# Patient Record
Sex: MALE | Race: WHITE | URBAN - METROPOLITAN AREA
[De-identification: names, ages, dates, MRNs, and addresses within clinical notes are randomized per-mention and may not be internally consistent; named-entity substitution may affect disease eponyms.]

---

## 2017-02-06 ENCOUNTER — OFFICE VISIT (OUTPATIENT)
Dept: FAMILY MEDICINE | Facility: CLINIC | Age: 52
End: 2017-02-06
Payer: MEDICAID

## 2017-02-06 ENCOUNTER — RADIANT APPOINTMENT (OUTPATIENT)
Dept: GENERAL RADIOLOGY | Facility: CLINIC | Age: 52
End: 2017-02-06
Attending: FAMILY MEDICINE
Payer: MEDICAID

## 2017-02-06 ENCOUNTER — TRANSFERRED RECORDS (OUTPATIENT)
Dept: HEALTH INFORMATION MANAGEMENT | Facility: CLINIC | Age: 52
End: 2017-02-06

## 2017-02-06 VITALS
DIASTOLIC BLOOD PRESSURE: 104 MMHG | SYSTOLIC BLOOD PRESSURE: 152 MMHG | WEIGHT: 251 LBS | OXYGEN SATURATION: 95 % | HEIGHT: 73 IN | RESPIRATION RATE: 16 BRPM | HEART RATE: 101 BPM | TEMPERATURE: 97.1 F | BODY MASS INDEX: 33.27 KG/M2

## 2017-02-06 DIAGNOSIS — K40.90 UNILATERAL INGUINAL HERNIA WITHOUT OBSTRUCTION OR GANGRENE, RECURRENCE NOT SPECIFIED: ICD-10-CM

## 2017-02-06 DIAGNOSIS — I10 HYPERTENSION GOAL BP (BLOOD PRESSURE) < 140/90: ICD-10-CM

## 2017-02-06 DIAGNOSIS — I50.9 ACUTE CONGESTIVE HEART FAILURE, UNSPECIFIED CONGESTIVE HEART FAILURE TYPE: ICD-10-CM

## 2017-02-06 DIAGNOSIS — R53.83 FATIGUE, UNSPECIFIED TYPE: ICD-10-CM

## 2017-02-06 DIAGNOSIS — R06.02 SOB (SHORTNESS OF BREATH) ON EXERTION: ICD-10-CM

## 2017-02-06 DIAGNOSIS — R94.31 ABNORMAL ELECTROCARDIOGRAM: Primary | ICD-10-CM

## 2017-02-06 DIAGNOSIS — R05.9 COUGH: ICD-10-CM

## 2017-02-06 LAB
ANION GAP SERPL CALCULATED.3IONS-SCNC: ABNORMAL MMOL/L
BASOPHILS # BLD AUTO: 0.1 10E9/L (ref 0–0.2)
BASOPHILS NFR BLD AUTO: 0.4 %
BNP SERPL-MCNC: 2625 PG/ML
BUN SERPL-MCNC: 25 MG/DL
CALCIUM SERPL-MCNC: 9.4 MG/DL
CHLORIDE SERPLBLD-SCNC: 98 MMOL/L
CO2 SERPL-SCNC: 20 MMOL/L
CREAT SERPL-MCNC: 1.27 MG/DL
DIFFERENTIAL METHOD BLD: ABNORMAL
EOSINOPHIL # BLD AUTO: 0.1 10E9/L (ref 0–0.7)
EOSINOPHIL NFR BLD AUTO: 0.5 %
ERYTHROCYTE [DISTWIDTH] IN BLOOD BY AUTOMATED COUNT: 14.9 % (ref 10–15)
ERYTHROCYTE [DISTWIDTH] IN BLOOD BY AUTOMATED COUNT: NORMAL %
GFR SERPL CREATININE-BSD FRML MDRD: ABNORMAL ML/MIN/1.73M2
GLUCOSE SERPL-MCNC: 111 MG/DL (ref 70–99)
HBA1C MFR BLD: 6 % (ref 4.3–6)
HCT VFR BLD AUTO: 43.7 % (ref 40–53)
HCT VFR BLD AUTO: NORMAL %
HEMOGLOBIN: NORMAL G/DL (ref 13.3–17.7)
HGB BLD-MCNC: 13.9 G/DL (ref 13.3–17.7)
LYMPHOCYTES # BLD AUTO: 1 10E9/L (ref 0.8–5.3)
LYMPHOCYTES NFR BLD AUTO: 8.5 %
MCH RBC QN AUTO: 24.9 PG (ref 26.5–33)
MCH RBC QN AUTO: NORMAL PG
MCHC RBC AUTO-ENTMCNC: 31.8 G/DL (ref 31.5–36.5)
MCHC RBC AUTO-ENTMCNC: NORMAL G/DL
MCV RBC AUTO: 77 FL
MCV RBC AUTO: 78 FL (ref 78–100)
MONOCYTES # BLD AUTO: 1.2 10E9/L (ref 0–1.3)
MONOCYTES NFR BLD AUTO: 10.1 %
NEUTROPHILS # BLD AUTO: 9.9 10E9/L (ref 1.6–8.3)
NEUTROPHILS NFR BLD AUTO: 80.5 %
PLATELET # BLD AUTO: 277 10^9/L
PLATELET # BLD AUTO: 301 10E9/L (ref 150–450)
POTASSIUM SERPL-SCNC: 3.9 MMOL/L
RBC # BLD AUTO: 5.58 10E12/L (ref 4.4–5.9)
RBC # BLD AUTO: NORMAL 10^12/L
SODIUM SERPL-SCNC: 134 MMOL/L
WBC # BLD AUTO: 12.3 10E9/L (ref 4–11)
WBC # BLD AUTO: NORMAL 10^9/L

## 2017-02-06 PROCEDURE — 99214 OFFICE O/P EST MOD 30 MIN: CPT | Performed by: FAMILY MEDICINE

## 2017-02-06 PROCEDURE — 71020 XR CHEST 2 VW: CPT

## 2017-02-06 PROCEDURE — 93000 ELECTROCARDIOGRAM COMPLETE: CPT | Performed by: FAMILY MEDICINE

## 2017-02-06 PROCEDURE — 36415 COLL VENOUS BLD VENIPUNCTURE: CPT | Performed by: FAMILY MEDICINE

## 2017-02-06 PROCEDURE — 83036 HEMOGLOBIN GLYCOSYLATED A1C: CPT | Performed by: FAMILY MEDICINE

## 2017-02-06 PROCEDURE — 85025 COMPLETE CBC W/AUTO DIFF WBC: CPT | Performed by: FAMILY MEDICINE

## 2017-02-06 NOTE — PATIENT INSTRUCTIONS
"  HPI:     Otoniel is a 52 yo patient with a HTN, Hypothyroidism presenting with SOB, cough and fatigue for the past 3 days preceded by flu like symptoms.. He had not been taking medication    O/E:   /104 mmHg  Pulse 101  Temp(Src) 97.1  F (36.2  C)  Resp 16  Ht 6' 1\" (1.854 m)  Wt 251 lb (113.853 kg)  BMI 33.12 kg/m2  SpO2 95%  General: Appears to be in moderate distress  RS: SOB, rales with wheezes  CVS: Tachycardic, no murmur    EKG: Shows ST elevation in anterior leads    A/P:  1. CAD: Need ER evaluation.   2. CHF  3. Hernia    Moses Orona      [facility]    If you have any questions regarding to your visit please contact your care team:       Team Purple:   Clinic Hours Telephone Number   ED [address] Dr[address]   7am-7pm  Monday - Thursday   7am-5pm  Fridays  [phone]  (Appointment scheduling available 24/7)    Questions about your Visit?   Team Line:  [phone]   Urgent Care - [facility] [facility] - 11am-9pm Monday-Friday Saturday-Sunday- 9am-5pm   [place] - 5pm-9pm Monday-Friday Saturday-Sunday- 9am-5pm  [phone] - [place]   [phone] - [place]       What options do I have for visits at the clinic other than the traditional office visit?  To expand how we care for you, many of our providers are utilizing electronic visits (e-visits) and telephone visits, when medically appropriate, for interactions with their patients rather than a visit in the clinic.   We also offer nurse visits for many medical concerns. Just like any other service, we will bill your insurance company for this type of visit based on time spent on the phone with your provider. Not all insurance companies cover these visits. Please check with your medical insurance if this type of visit is covered. You will be responsible for any charges that are not paid by your insurance.      E-visits via Hyphen 8:  generally incur " a $35.00 fee.  Telephone visits:  Time spent on the phone: *charged based on time that is spent on the phone in increments of 10 minutes. Estimated cost:   5-10 mins $30.00   11-20 mins. $59.00   21-30 mins. $85.00     Use [email or web address] (secure email communication and access to your chart) to send your primary care provider a message or make an appointment. Ask someone on your Team how to sign up for Fitbit.  For a Price Quote for your services, please call our Consumer Price Line at [phone].  As always, Thank you for trusting us with your health care needs!    Discharged By: An

## 2017-02-06 NOTE — MR AVS SNAPSHOT
"              After Visit Summary   2/6/2017    Otoniel Carlton    MRN: [id number]           Patient Information     Date Of Birth          1965        Visit Information        Provider Department      2/6/2017 2:20 PM Moses Orona MD [facility]        Today's Diagnoses     Cough    -  1     SOB (shortness of breath) on exertion         Hypertension goal BP (blood pressure) < 140/90         LBBB (left bundle branch block)         Hypothyroidism, unspecified type         Unilateral inguinal hernia without obstruction or gangrene, recurrence not specified         Fatigue, unspecified type           Care Instructions      HPI:     Otoniel is a 52 yo patient with a HTN, Hypothyroidism presenting with SOB, cough and fatigue for the past 3 days preceded by flu like symptoms.. He had not been taking medication    O/E:   /104 mmHg  Pulse 101  Temp(Src) 97.1  F (36.2  C)  Resp 16  Ht 6' 1\" (1.854 m)  Wt 251 lb (113.853 kg)  BMI 33.12 kg/m2  SpO2 95%  General: Appears to be in moderate distress  RS: SOB, rales with wheezes  CVS: Tachycardic, no murmur    EKG: Shows ST elevation in anterior leads    A/P:  1. CAD: Need ER evaluation.   2. CHF  3. Hernia    Moses Orona      [facility]    If you have any questions regarding to your visit please contact your care team:       Team Purple:   Clinic Hours Telephone Number   ED [address] Dr[address]   7am-7pm  Monday - Thursday   7am-5pm  Fridays  [phone]  (Appointment scheduling available 24/7)    Questions about your Visit?   Team Line:  [phone]   Urgent Care - [place] - 11am-9pm Monday-Friday Saturday-Sunday- 9am-5pm   [place] - 5pm-9pm Monday-Friday Saturday-Sunday- 9am-5pm  [phone] - [place]  [phone] - [place]       What options do I have for visits at the clinic other than the traditional office visit?  To expand " how we care for you, many of our providers are utilizing electronic visits (e-visits) and telephone visits, when medically appropriate, for interactions with their patients rather than a visit in the clinic.   We also offer nurse visits for many medical concerns. Just like any other service, we will bill your insurance company for this type of visit based on time spent on the phone with your provider. Not all insurance companies cover these visits. Please check with your medical insurance if this type of visit is covered. You will be responsible for any charges that are not paid by your insurance.      E-visits via BuzzSpicet:  generally incur a $35.00 fee.  Telephone visits:  Time spent on the phone: *charged based on time that is spent on the phone in increments of 10 minutes. Estimated cost:   5-10 mins $30.00   11-20 mins. $59.00   21-30 mins. $85.00     Use [employer] Finanzas (secure email communication and access to your chart) to send your primary care provider a message or make an appointment. Ask someone on your Team how to sign up for [employer].  For a Price Quote for your services, please call our Xumii Line at [phone].  As always, Thank you for trusting us with your health care needs!    Discharged By: An          Follow-ups after your visit        Who to contact     If you have questions or need follow up information about today's clinic visit or your schedule please contact [facility] directly at [phone].  Normal or non-critical lab and imaging results will be communicated to you by Inspirishart, letter or phone within 4 business days after the clinic has received the results. If you do not hear from us within 7 days, please contact the clinic through Inspirishart or phone. If you have a critical or abnormal lab result, we will notify you by phone as soon as possible.  Submit refill requests through [employer] Finanzas or call your pharmacy and they will forward the refill request to us. Please allow 3  "business days for your refill to be completed.          Additional Information About Your Visit        MyChart Information     [employer] gives you secure access to your electronic health record. If you see a primary care provider, you can also send messages to your care team and make appointments. If you have questions, please call your primary care clinic.  If you do not have a primary care provider, please call [phone] and they will assist you.        Care EveryWhere ID     This is your Care EveryWhere ID. This could be used by other organizations to access your [facility] medical records  [id number]        Your Vitals Were     Pulse Temperature Respirations Height BMI (Body Mass Index) Pulse Oximetry    101 97.1  F (36.2  C) 16 6' 1\" (1.854 m) 33.12 kg/m2 95%       Blood Pressure from Last 3 Encounters:   02/06/17 152/104   06/18/15 146/91   06/15/15 146/91    Weight from Last 3 Encounters:   02/06/17 251 lb (113.853 kg)   06/18/15 252 lb 6.4 oz (114.488 kg)   06/15/15 260 lb 1.6 oz (117.981 kg)              We Performed the Following     CBC with platelets differential     EKG 12-lead complete w/read - Clinics     Hemoglobin A1c     XR Chest 2 Views        Primary Care Provider Office Phone # Fax #    Jose Arias [phone]       [facility] [address] [facility] [address]        Thank you!     Thank you for choosing [facility]  for your care. Our goal is always to provide you with excellent care. Hearing back from our patients is one way we can continue to improve our services. Please take a few minutes to complete the written survey that you may receive in the mail after your visit with us. Thank you!             Your Updated Medication List - Protect others around you: Learn how to safely use, store and throw away your medicines at www.disposemymeds.org.          This list is accurate as of: 2/6/17  4:05 PM.  Always use your most recent med list.          "          Brand Name Dispense Instructions for use    amLODIPine 10 MG tablet    NORVASC    30 tablet    TAKE 1 TABLET BY MOUTH DAILY       carvedilol 6.25 MG tablet    COREG    60 tablet    Take 1 tablet (6.25 mg) by mouth 2 times daily (with meals)       citalopram 20 MG tablet    celeXA    90 tablet    Take 1 tablet (20 mg) by mouth daily       cloNIDine 0.2 MG tablet    CATAPRES    60 tablet    TAKE 1 TABLET BY MOUTH TWICE DAILY       levothyroxine 25 MCG tablet    SYNTHROID/LEVOTHROID    90 tablet    Take 1 tablet (25 mcg) by mouth daily       losartan-hydrochlorothiazide 100-12.5 MG per tablet    HYZAAR    90 tablet    TAKE 1 TABLET BY MOUTH DAILY       minoxidil 2.5 MG tablet    LONITEN    180 tablet    TAKE 2 TABLETS BY MOUTH DAILY       pantoprazole 20 MG EC tablet    PROTONIX    90 tablet    Take by mouth 30-60 minutes before a meal.       zolpidem 10 MG tablet    AMBIEN    5 tablet    Take 1 tablet (10 mg) by mouth nightly as needed for sleep

## 2017-02-06 NOTE — PROGRESS NOTES
SUBJECTIVE:                                                    Otoniel Carlton is a 51 year old male who presents to clinic today for the following health issues:    Acute Illness   Acute illness concerns: cough  Onset: since january     Fever: no     Chills/Sweats: YES    Headache (location?): no     Sinus Pressure:no    Conjunctivitis:  no    Ear Pain: no    Rhinorrhea: YES    Congestion: YES    Sore Throat: no      Cough: YES-with shortness of breath, worsening over time    Wheeze: YES    Decreased Appetite: no     Nausea: YES    Vomiting: no     Diarrhea:  YES    Dysuria/Freq.: no    Fatigue/Achiness: YES    Sick/Strep Exposure: YES     Therapies Tried and outcome:     Patient presenting with cough, SOB, hot and cold flushes, diarrhea x 2 days.  He now has a hernia in the Rt inguinal area and disappears with laying down.  Denies any chest pain, no dizziness, nausea or vomiting.  Has a bulge in the Rt Groin  He was on BP medications but due to insurance lapses not been taking his medications.    Hypertension:  blood pressure  BP Readings from Last 6 Encounters:   02/06/17 152/104   06/18/15 146/91   06/15/15 146/91   05/18/15 134/79   05/12/15 127/77   05/11/15 117/68     Problem list and histories reviewed & adjusted, as indicated.  Additional history: as documented    Patient Active Problem List   Diagnosis     Hypertension goal BP (blood pressure) < 140/90     LBBB (left bundle branch block)     SOB (shortness of breath) on exertion     Depression with anxiety     Hypothyroidism     Past Surgical History   Procedure Laterality Date     Bursitis       left arm     Tonsillectomy         Social History   Substance Use Topics     Smoking status: Former Smoker -- 0.50 packs/day for 17 years     Types: Cigarettes     Start date: 06/13/1993     Quit date: 06/13/2010     Smokeless tobacco: Never Used     Alcohol Use: Yes      Comment: social     Family History   Problem Relation Age of Onset     Hypertension Mother       "Hypertension Father      CANCER Mother      breast cancer     CANCER Maternal Aunt      breast cancer     DIABETES No family hx of      CEREBROVASCULAR DISEASE No family hx of      Thyroid Disease No family hx of      Glaucoma No family hx of      Macular Degeneration No family hx of      KIDNEY DISEASE No family hx of      Coronary Artery Disease Father      1st MI in his 50s,  at age 71 - MI     Hypertension Mother      Hypertension Maternal Aunt            ROS:  Constitutional, HEENT, cardiovascular, pulmonary, gi and gu systems are negative, except as otherwise noted.    OBJECTIVE:                                                    /104 mmHg  Pulse 101  Temp(Src) 97.1  F (36.2  C)  Resp 16  Ht 6' 1\" (1.854 m)  Wt 251 lb (113.853 kg)  BMI 33.12 kg/m2  SpO2 95%  Body mass index is 33.12 kg/(m^2).  GENERAL: Sick looking, alert and mild distress  NECK: no adenopathy and thyroid normal to palpation  RESP: Some wheezing and basal crackles  CV: regular rate and rhythm, no murmur, click or rub, mild peripheral edema  ABDOMEN: soft, nontender, no masses and bowel sounds normal  MS: no gross musculoskeletal defects noted, no edema    Diagnostic Test Results:     Results for orders placed or performed in visit on 17   XR Chest 2 Views    Narrative    CHEST TWO VIEWS   2017 3:55 PM     HISTORY: Shortness of breath.    COMPARISON: 2006.      Impression    IMPRESSION: Heart is enlarged. There is congestion of the central  pulmonary vascularity and bilateral interstitial infiltrate which  could represent edema. These findings are likely due to congestive  failure. No definite pleural effusions are seen. The remainder of the  chest is negative.     DENA WHITE MD   CBC with platelets differential   Result Value Ref Range    WBC 12.3 (H) 4.0 - 11.0 10e9/L    RBC Count 5.58 4.4 - 5.9 10e12/L    Hemoglobin 13.9 13.3 - 17.7 g/dL    Hematocrit 43.7 40.0 - 53.0 %    MCV 78 78 - 100 fl    MCH 24.9 (L) " 26.5 - 33.0 pg    MCHC 31.8 31.5 - 36.5 g/dL    RDW 14.9 10.0 - 15.0 %    Platelet Count 301 150 - 450 10e9/L    Diff Method Automated Method     % Neutrophils 80.5 %    % Lymphocytes 8.5 %    % Monocytes 10.1 %    % Eosinophils 0.5 %    % Basophils 0.4 %    Absolute Neutrophil 9.9 (H) 1.6 - 8.3 10e9/L    Absolute Lymphocytes 1.0 0.8 - 5.3 10e9/L    Absolute Monocytes 1.2 0.0 - 1.3 10e9/L    Absolute Eosinophils 0.1 0.0 - 0.7 10e9/L    Absolute Basophils 0.1 0.0 - 0.2 10e9/L   Hemoglobin A1c   Result Value Ref Range    Hemoglobin A1C 6.0 4.3 - 6.0 %          ASSESSMENT/PLAN:                                                    (R94.31) Abnormal electrocardiogram  (primary encounter diagnosis)  Comment: Concern for CAD with the SOB and pulmonary congestion on CXR  Plan: Evaluation in ER, patient declining ambulance but explained urgency of the situation agreed to go by ambulance.    (I50.9) Acute congestive heart failure, unspecified congestive heart failure type (H)  Comment: CXR with cardiomegaly and pulmonary congestion; symptoms consistent with acute CHF  Plan: ER evaluation    (R05) Cough  Comment: From pulmonary congestion likely from acute CHF from   Plan: XR Chest 2 Views, CBC with platelets         differential    (R06.02) SOB (shortness of breath) on exertion  Comment: EKG and CXR abnormal  Plan: EKG 12-lead complete w/read - Clinics, XR Chest        2 Views    (I10) Hypertension goal BP (blood pressure) < 140/90  Comment: Uncontrolled. Non compliance with medication  Plan: Will restart medication.    (R53.83) Fatigue, unspecified type  Comment: A1c normal, no diabetes, fatigue from heart failure.  Plan: Hemoglobin A1c    (K40.90) Unilateral inguinal hernia without obstruction or gangrene, recurrence not specified  Comment: Reducible. Non obstructive  Plan: Assess after acute treatment    Will go to ER by ambulance.    Moses Orona MD  [facility]

## 2017-02-07 ENCOUNTER — TRANSFERRED RECORDS (OUTPATIENT)
Dept: CARDIOLOGY | Facility: CLINIC | Age: 52
End: 2017-02-07

## 2017-02-07 LAB
EJECTION FRACTION: 18
TSH SERPL-ACNC: 3.87 MCU/ML

## 2017-02-09 ENCOUNTER — TRANSFERRED RECORDS (OUTPATIENT)
Dept: CARDIOLOGY | Facility: CLINIC | Age: 52
End: 2017-02-09

## 2017-02-11 LAB
ANION GAP SERPL CALCULATED.3IONS-SCNC: NORMAL MMOL/L
BUN SERPL-MCNC: NORMAL MG/DL
CALCIUM SERPL-MCNC: NORMAL MG/DL
CHLORIDE SERPLBLD-SCNC: NORMAL MMOL/L
CO2 SERPL-SCNC: NORMAL MMOL/L
CREAT SERPL-MCNC: 1.15 MG/DL
GFR SERPL CREATININE-BSD FRML MDRD: NORMAL ML/MIN/1.73M2
GLUCOSE SERPL-MCNC: NORMAL MG/DL (ref 70–99)
HEMOGLOBIN: 13.9 G/DL (ref 13.3–17.7)
POTASSIUM SERPL-SCNC: 4.5 MMOL/L
SODIUM SERPL-SCNC: 139 MMOL/L
WBC # BLD AUTO: 8.3 10^9/L

## 2017-03-14 ENCOUNTER — TRANSFERRED RECORDS (OUTPATIENT)
Dept: HEALTH INFORMATION MANAGEMENT | Facility: CLINIC | Age: 52
End: 2017-03-14

## 2017-03-16 ENCOUNTER — DOCUMENTATION ONLY (OUTPATIENT)
Dept: CARDIOLOGY | Facility: CLINIC | Age: 52
End: 2017-03-16

## 2017-03-16 NOTE — PROGRESS NOTES
Records request sent to [facility].  Hospital summary stating an echo and angiogram were done on recent hospitalization, no records available.

## 2017-03-29 DIAGNOSIS — E03.9 HYPOTHYROIDISM, UNSPECIFIED TYPE: ICD-10-CM

## 2017-03-29 DIAGNOSIS — I10 ESSENTIAL HYPERTENSION: ICD-10-CM

## 2017-03-29 DIAGNOSIS — I10 HYPERTENSION GOAL BP (BLOOD PRESSURE) < 140/90: ICD-10-CM

## 2017-03-30 NOTE — TELEPHONE ENCOUNTER
losartan-hydrochlorothiazide (HYZAAR) 100-12.5 MG per tablet   Last Written Prescription Date: 3/1/16  Last Fill Quantity: 90, # refills: 0  Last Office Visit with [facility], [facility] or [facility] prescribing provider: 2/6/17       Potassium   Date Value Ref Range Status   02/11/2017 4.5 mmol/L Final     Creatinine   Date Value Ref Range Status   02/11/2017 1.15 mg/dL Final     BP Readings from Last 3 Encounters:   02/06/17 (!) 152/104   06/18/15 (!) 146/91   06/15/15 (!) 146/91       amLODIPine (NORVASC) 10 MG tablet      Last Written Prescription Date: 2/23/16  Last Fill Quantity: 30, # refills: 0    Last Office Visit with [facility] or [facility] prescribing provider:  2/6/17   Future Office Visit:        BP Readings from Last 3 Encounters:   02/06/17 (!) 152/104   06/18/15 (!) 146/91   06/15/15 (!) 146/91       levothyroxine (SYNTHROID, LEVOTHROID) 25 MCG tablet     Last Written Prescription Date: 5/1215  Last Quantity: 90, # refills: 3  Last Office Visit with [facility], [facility] or [facility] prescribing provider: 2/6/17        TSH   Date Value Ref Range Status   02/07/2017 3.87 mcU/mL Final       minoxidil (LONITEN) 2.5 MG tablet      Last Written Prescription Date:  12/22/15  Last Fill Quantity: 180,   # refills: 0  Last Office Visit with [facility] or [facility] prescribing provider: 2/6/17  Future Office visit:       Routing refill request to provider for review/approval because:  Drug not on the [facility], [facility] or [facility] refill protocol or controlled substance          Amos Faarax  [facility]

## 2017-04-03 RX ORDER — AMLODIPINE BESYLATE 10 MG/1
TABLET ORAL
Qty: 30 TABLET | Refills: 0 | Status: SHIPPED | OUTPATIENT
Start: 2017-04-03 | End: 2017-07-12

## 2017-04-03 RX ORDER — MINOXIDIL 2.5 MG/1
TABLET ORAL
Qty: 180 TABLET | Refills: 0 | Status: SHIPPED | OUTPATIENT
Start: 2017-04-03 | End: 2017-07-12

## 2017-04-03 RX ORDER — LEVOTHYROXINE SODIUM 25 UG/1
TABLET ORAL
Qty: 90 TABLET | Refills: 0 | Status: SHIPPED | OUTPATIENT
Start: 2017-04-03

## 2017-04-03 RX ORDER — LOSARTAN POTASSIUM AND HYDROCHLOROTHIAZIDE 12.5; 1 MG/1; MG/1
TABLET ORAL
Qty: 90 TABLET | Refills: 0 | Status: SHIPPED | OUTPATIENT
Start: 2017-04-03 | End: 2017-07-12

## 2017-04-04 NOTE — TELEPHONE ENCOUNTER
Abdulaziz Ambrose contacted Otoniel on 04/04/17 and left a message for pt rx has been refilled. If patient calls back please schedule appointment as soon as possible for BP check with Dr. Arias.  Abdulaziz Ambrose,  For Teams Com[facility] and [facility]

## 2017-04-21 ENCOUNTER — TRANSFERRED RECORDS (OUTPATIENT)
Dept: HEALTH INFORMATION MANAGEMENT | Facility: CLINIC | Age: 52
End: 2017-04-21

## 2017-07-12 DIAGNOSIS — I10 HYPERTENSION GOAL BP (BLOOD PRESSURE) < 140/90: ICD-10-CM

## 2017-07-12 DIAGNOSIS — I10 ESSENTIAL HYPERTENSION: ICD-10-CM

## 2017-07-12 RX ORDER — LOSARTAN POTASSIUM AND HYDROCHLOROTHIAZIDE 12.5; 1 MG/1; MG/1
1 TABLET ORAL DAILY
Qty: 30 TABLET | Refills: 0 | Status: SHIPPED | OUTPATIENT
Start: 2017-07-12

## 2017-07-12 RX ORDER — AMLODIPINE BESYLATE 10 MG/1
10 TABLET ORAL DAILY
Qty: 30 TABLET | Refills: 0 | Status: SHIPPED | OUTPATIENT
Start: 2017-07-12

## 2017-07-12 RX ORDER — MINOXIDIL 2.5 MG/1
TABLET ORAL
Qty: 60 TABLET | Refills: 0 | Status: SHIPPED | OUTPATIENT
Start: 2017-07-12

## 2017-07-12 NOTE — TELEPHONE ENCOUNTER
Reason for Call:  Medication or medication refill:    Do you use a [facility]?  Name of the pharmacy and phone number for the current request:  [facility] [address] [place], [place] - [address]    Name of the medication requested: Norvasc 10 mg, Losartan hctz 100-12.5. Minoxidil 2.5 mg    Other request: Made appointment to see you in person in 1 week but out of meds can you please call when approved so I can pick them up thank you  Sending high priority message    Can we leave a detailed message on this number? YES    Phone number patient can be reached at: Cell number on file:    Telephone Information:   Mobile [phone]       Best Time: any    Call taken on 7/12/2017 at 2:40 PM by Ivelisse Mtz

## 2017-07-12 NOTE — TELEPHONE ENCOUNTER
Last OV with Dr. ARIAS, 6/18/15 (patient saw a [place] provider on 2/6/17).      Marlene refills given 3/29/17 with patient advised to follow up in clinic.    Pended 1 week refills per patient request below for review by provider.    Routing refill request to provider for review/approval because:  Marlene given x1   Next 5 appointments (look out 90 days)     Jul 19, 2017  7:00 AM CDT   Office Visit with Jose Arias MD   [facility] ([facility]    [address] [place] [id number] [phone]

## 2017-07-24 ENCOUNTER — TELEPHONE (OUTPATIENT)
Dept: FAMILY MEDICINE | Facility: CLINIC | Age: 52
End: 2017-07-24

## 2017-07-24 NOTE — LETTER
[address]  [facility] [address]  Phone: [phone]            July 27, 2017          Otoniel Carlton,  [address]  [facility] [address]        Dear Otoniel Carlton      Monitoring and managing your preventative and chronic health conditions are very important to us. Our records indicate that you have not scheduled for Appointment with your provider to have your blood pressure recheck which was recommended by Dr. Orona.      If you have received your health care elsewhere, please call the clinic so the information can be documented in your chart.    Please call [phone] or message us through your Cat Amania account to schedule an appointment or provide information for your chart.     Feel free to contact us if you have any questions or concerns!    I look forward to seeing you and working with you on your health care needs.     Sincerely,         Moses Orona / ulysses

## 2017-07-24 NOTE — TELEPHONE ENCOUNTER
Panel Management Review      Patient has the following on his problem list:     Hypertension   Last three blood pressure readings:  BP Readings from Last 3 Encounters:   02/06/17 (!) 152/104   06/18/15 (!) 146/91   06/15/15 (!) 146/91     Blood pressure: FAILED    HTN Guidelines:  Age 18-59 BP range:  Less than 140/90  Age 60-85 with Diabetes:  Less than 140/90  Age 60-85 without Diabetes:  less than 150/90        Composite cancer screening  Chart review shows that this patient is due/due soon for the following Colonoscopy  Summary:    Patient is due/failing the following:   BP CHECK and COLONOSCOPY    Action needed:   Patient needs office visit for Recheck BP.    Type of outreach:    Phone, left message for patient to call back.     Questions for provider review:    None                                                                                                                                    An PEGGY Gama       Chart routed to Care Team .

## 2017-11-07 ENCOUNTER — TELEPHONE (OUTPATIENT)
Dept: FAMILY MEDICINE | Facility: CLINIC | Age: 52
End: 2017-11-07

## 2017-11-07 NOTE — LETTER
November 9, 2017          Otoniel Carlton,  [address]  [facility] [address]        Dear Otoniel Carlton      Monitoring and managing your preventative and chronic health conditions are very important to us. Our records indicate that you have not scheduled for Colonoscopy and blood pressure check  which was recommended by Dr. Orona.      If you have received your health care elsewhere, please call the clinic so the information can be documented in your chart.    Please call [phone] or message us through your BeMyEye account to schedule an appointment or provide information for your chart.     Feel free to contact us if you have any questions or concerns!    I look forward to seeing you and working with you on your health care needs.     Sincerely,         Moses Orona / LUZ

## 2017-11-07 NOTE — TELEPHONE ENCOUNTER
Panel Management Review      Patient has the following on his problem list:     Hypertension   Last three blood pressure readings:  BP Readings from Last 3 Encounters:   02/06/17 (!) 152/104   06/18/15 (!) 146/91   06/15/15 (!) 146/91     Blood pressure: FAILED    HTN Guidelines:  Age 18-59 BP range:  Less than 140/90  Age 60-85 with Diabetes:  Less than 140/90  Age 60-85 without Diabetes:  less than 150/90        Composite cancer screening  Chart review shows that this patient is due/due soon for the following Colonoscopy  Summary:    Patient is due/failing the following:   BP CHECK, COLONOSCOPY and LDL    Action needed:   Patient needs office visit for BP Check.    Type of outreach:    Phone, left message for patient to call back.     Questions for provider review:    None                                                                                                                                    Shari Gama MA       Chart routed to Care Team .

## 2017-12-16 ENCOUNTER — TELEPHONE (OUTPATIENT)
Dept: FAMILY MEDICINE | Facility: CLINIC | Age: 52
End: 2017-12-16

## 2017-12-16 NOTE — TELEPHONE ENCOUNTER
12/16/2017    Call Regarding Preventive Health Screening Colonoscopy    Attempt 3    Message on voicemail     Comments: Clinic made prior attempt(s)        Outreach   Ava Delcid

## 2018-02-22 ENCOUNTER — TELEPHONE (OUTPATIENT)
Dept: FAMILY MEDICINE | Facility: CLINIC | Age: 53
End: 2018-02-22

## 2018-02-22 NOTE — TELEPHONE ENCOUNTER
Panel Management Review      Patient has the following on his problem list:     Hypertension   Last three blood pressure readings:  BP Readings from Last 3 Encounters:   02/06/17 (!) 152/104   06/18/15 (!) 146/91   06/15/15 (!) 146/91     Blood pressure: FAILED    HTN Guidelines:  Age 18-59 BP range:  Less than 140/90  Age 60-85 with Diabetes:  Less than 140/90  Age 60-85 without Diabetes:  less than 150/90      Composite cancer screening  Chart review shows that this patient is due/due soon for the following Colonoscopy  Summary:    Patient is due/failing the following:   BP CHECK, COLONOSCOPY and LDL    Action needed:   Patient needs office visit for Blood pressure check.    Type of outreach:    Sent letter.    Questions for provider review:    None                                                                                                                                    Shari Gama MA       Chart routed to none .

## 2018-02-22 NOTE — LETTER
February 22, 2018          Otoniel Carlton,  [address]  [facility] [address]        Dear Otoniel Carlton      Monitoring and managing your preventative and chronic health conditions are very important to us. Our records indicate that you have not scheduled for blood pressure checkwhich was recommended by Dr. Orona.      If you have received your health care elsewhere, please call the clinic so the information can be documented in your chart.    Please call [phone] or message us through your [employer] account to schedule an appointment or provide information for your chart.     Feel free to contact us if you have any questions or concerns!    I look forward to seeing you and working with you on your health care needs.     Sincerely,         Moses Orona / LUZ

## 2020-02-10 ENCOUNTER — HEALTH MAINTENANCE LETTER (OUTPATIENT)
Age: 55
End: 2020-02-10

## 2020-11-06 ENCOUNTER — TRANSFERRED RECORDS (OUTPATIENT)
Dept: HEALTH INFORMATION MANAGEMENT | Facility: CLINIC | Age: 55
End: 2020-11-06

## 2020-11-16 ENCOUNTER — HEALTH MAINTENANCE LETTER (OUTPATIENT)
Age: 55
End: 2020-11-16

## 2021-04-04 ENCOUNTER — HEALTH MAINTENANCE LETTER (OUTPATIENT)
Age: 56
End: 2021-04-04

## 2021-09-18 ENCOUNTER — HEALTH MAINTENANCE LETTER (OUTPATIENT)
Age: 56
End: 2021-09-18

## 2022-04-30 ENCOUNTER — HEALTH MAINTENANCE LETTER (OUTPATIENT)
Age: 57
End: 2022-04-30

## 2022-11-20 ENCOUNTER — HEALTH MAINTENANCE LETTER (OUTPATIENT)
Age: 57
End: 2022-11-20

## 2023-06-01 ENCOUNTER — HEALTH MAINTENANCE LETTER (OUTPATIENT)
Age: 58
End: 2023-06-01